# Patient Record
Sex: FEMALE | Race: WHITE | NOT HISPANIC OR LATINO | Employment: OTHER | URBAN - METROPOLITAN AREA
[De-identification: names, ages, dates, MRNs, and addresses within clinical notes are randomized per-mention and may not be internally consistent; named-entity substitution may affect disease eponyms.]

---

## 2024-10-25 ENCOUNTER — TRANSCRIBE ORDERS (OUTPATIENT)
Dept: PHYSICAL THERAPY | Facility: HOSPITAL | Age: 69
End: 2024-10-25
Payer: MEDICARE

## 2024-10-25 DIAGNOSIS — I89.0 CHRONIC ACQUIRED LYMPHEDEMA: Primary | ICD-10-CM

## 2024-12-05 ENCOUNTER — HOSPITAL ENCOUNTER (OUTPATIENT)
Dept: OCCUPATIONAL THERAPY | Facility: HOSPITAL | Age: 69
Discharge: HOME | End: 2024-12-05
Admitting: FAMILY MEDICINE
Payer: MEDICARE

## 2024-12-05 DIAGNOSIS — I89.0 LYMPHEDEMA OF BOTH LOWER EXTREMITIES: Primary | ICD-10-CM

## 2024-12-05 PROCEDURE — 97166 OT EVAL MOD COMPLEX 45 MIN: CPT

## 2024-12-05 PROCEDURE — 97535 SELF CARE MNGMENT TRAINING: CPT

## 2024-12-05 NOTE — THERAPY EVALUATION
Outpatient Occupational Therapy Lymphedema Initial Evaluation  [facility]     Patient Name: Raquel Montiel  : 1955  MRN: [id number]  Today's Date: 2024      Visit Date: 2024    There is no problem list on file for this patient.       No past medical history on file.     No past surgical history on file.      Visit Dx:     ICD-10-CM ICD-9-CM   1. Lymphedema of both lower extremities  I89.0 457.1        Patient History       Row Name 24 1300             History    Chief Complaint Swelling  -RE      Date Current Problem(s) Began 10/25/24  Referral date  -RE      Brief Description of Current Complaint Patient presents with complaints of bilateral lower extremity swelling.  She reports approximately a 5 to 6-year history of swelling in her right lower leg and a 2-year history of swelling in her left lower leg.  She has had 1 episode of cellulitis in the right lower extremity.  She has also had some problems with weeping from the legs.  She reports that since her Lasix dosage was increased some of her swelling has decreased.  -RE      Patient/Caregiver Goals Know what to do to help the symptoms;Decrease swelling  -RE      How has patient tried to help current problem? compression stocking but they caused knee swelling  -RE      Are you or can you be pregnant No  -RE         Pain     Pain Location Leg  -RE      Pain at Present 0  -RE      Pain Comments no pain at rest. 4/10 on palpation.  -RE         Fall Risk Assessment    Any falls in the past year: No  -RE      Number of falls reported in the last 12 months 0  -RE         Services    Are you currently receiving Home Health services No  -RE         Daily Activities    Primary Language English  -RE      Are you able to read Yes  -RE      Are you able to write Yes  -RE      How does patient learn best? Listening;Reading;Demonstration  -RE      Teaching needs identified Home Exercise Program;Management of Condition  -RE      Does patient have  "problems with the following? None  -RE      Barriers to learning None  -RE      Explanation of Functional Status Problem Patient uses a scooter at work due to knee pain.  No reported problems with ADLs.  -RE      Pt Participated in POC and Goals Yes  -RE         Safety    Are you being hurt, hit, or frightened by anyone at home or in your life? No  -RE      Are you being neglected by a caregiver No  -RE                User Key  (r) = Recorded By, (t) = Taken By, (c) = Cosigned By      Initials Name Provider Type    RE Flor Ron OTR Occupational Therapist                     Lymphedema       Row Name 12/05/24 1300             Subjective Pain    Able to rate subjective pain? yes  -RE      Pre-Treatment Pain Level 4  -RE      Post-Treatment Pain Level 4  -RE         Lymphedema Assessment    Lymphedema Classification RLE:;LLE:;stage 2 (Spontaneously Irreversible)  -RE         LLIS - Physical Concerns    The amount of pain associated with my lymphedema is: 3  -RE      The amount of limb heaviness associated with my lymphedema is: 2  -RE      The amount of skin tightness associated with my lymphedema is: 2  -RE      The size of my swollen limb(s) seems: 2  -RE      Lymphedema affects the movement of my swollen limb(s): 4  -RE      The strength in my swollen limb(s) is: 2  -RE         LLIS - Psychosocial Concerns    Lymphedema affects my body image (i.e., \"how I think I look\"). 4  -RE      Lymphedema affects my socializing with others. 4  -RE      Lymphedema affects my intimate relations with spouse or partner (rate 0 if not applicable 0  -RE      Lymphedema \"gets me down\" (i.e., depression, frustration, or anger) 1  -RE      I must rely on others for help due to my lymphedema. 4  -RE      I know what to do to manage my lymphedema 3  -RE         LLIS - Functional Concerns    Lymphedema affects my ability to perform self-care activities (i.e. eating, dressing, hygiene) 2  -RE      Lymphedema affects my ability to " perform routine home or work-related activities. 4  -RE      Lymphedema affects my performance of preferred leisure activities. 4  -RE      Lymphedema affects proper fit of clothing/shoes 3  -RE      Lymphedema affects my sleep 1  -RE         General ROM    GENERAL ROM COMMENTS Bilateral knee active range of motion is within functional limits but is painful.  -RE         MMT (Manual Muscle Testing)    General MMT Comments Lower extremity strength is grossly greater than or equal to 4/5 patient does have bilateral knee pain with resistance to flexion  -RE         Lymphedema Edema Assessment    Ptting Edema Category By grade out of 4  -RE      Pitting Edema + 3/4;Moderate;Severe  -RE      Stemmer Sign bilateral:;positive  -RE      Caldwell Hump bilateral:;negative  -RE         Skin Changes/Observations    Location/Assessment Lower Extremity  -RE      Lower Extremity Conditions bilateral:;intact;clean;dry  -RE      Lower Extremity Color/Pigment right:;hyperpigmented;brawny  -RE      Lower Extremity Skin Details Multiple small varicosities are located throughout bilateral lower legs.  Bilateral lower extremities are hyperpigmented however the right lower extremity is significantly worse.  -RE         Lymphedema Sensation    Lymphedema Sensation Reports RLE:;LLE:;normal  -RE         Lymphedema Measurements    Measurement Type(s) Circumferential  -RE      Circumferential Areas Lower extremities  -RE         BLE Circumferential (cm)    Measurement Location 1 20 cm above knee  -RE      Left 1 68 cm  -RE      Right 1 70 cm  -RE      Measurement Location 2 10 cm above knee  -RE      Left 2 58 cm  -RE      Right 2 61 cm  -RE      Measurement Location 3 Knee  -RE      Left 3 41 cm  -RE      Right 3 45 cm  -RE      Measurement Location 4 10 cm below knee  -RE      Left 4 45.5 cm  -RE      Right 4 48.5 cm  -RE      Measurement Location 5 20 cm below knee  -RE      Left 5 43.5 cm  -RE      Right 5 47.5 cm  -RE      Measurement  "Location 6 30 cm below knee  -RE      Left 6 34.9 cm  -RE      Right 6 36.5 cm  -RE      Measurement Location 7 Ankle  -RE      Left 7 31.5 cm  -RE      Right 7 32 cm  -RE      Measurement Location 8 Midfoot  -RE      Left 8 23.5 cm  -RE      Right 8 24.5 cm  -RE      LLE Circumferential Total 345.9 cm  -RE      RLE Circumferential Total 365 cm  -RE         Compression/Skin Care    Compression/Skin Care Comments Measured for bilateral lower leg reduction kits: Lower leg: Regular with and standard length; thigh: Regular width and short length.  I also measured for PAC bands for both feet size large  -RE         Lymphedema Life Impact Scale Totals    A.  Total Q1 - Q17 (Do not include Q18) 45  -RE      B.  Total number of questions answered (Q1-Q17) 17  -RE      C. Divide A by B 2.65  -RE      D. Multiple C by 25 66.25  -RE                User Key  (r) = Recorded By, (t) = Taken By, (c) = Cosigned By      Initials Name Provider Type    RE Flor Ron OTR Occupational Therapist                            Therapy Education  Education Details: Discussed lymphedema treatment including estimated duration. Gave patient \"Healthy Habits for Lymphedema Patients\" and \"What is Lymphedema\" and reviewed with patient. Discussed disease process and what to expect from therapy.  Given: Edema management, Symptoms/condition management, Bandaging/dressing change  Program: New  How Provided: Verbal, Written  Provided to: Patient  Level of Understanding: Teach back education performed, Verbalized  65692 - OT Self Care/Mgmt Minutes: 15         OT Goals       Row Name 12/05/24 1600          OT Short Term Goals    STG Date to Achieve 01/05/25  -RE     STG 1 Patient will demonstrate proper awareness of “What is Lymphedema?” and \"Healthy Habits\" for improved prevention, management, care of symptoms and ease of transition to self-care of condition.  -RE     STG 1 Progress New  -RE     STG 2 Patient independent and compliant with " self-wrapping techniques of compression bandages and/or velcro products with assistance of caregiver as needed for improved self-management of condition.  -RE     STG 2 Progress New  -RE     STG 3 Patient will demonstrate decreased net edema of bilateral lower extremities >/= 10-15cm  for decrease in edema, symptoms, decreased risk of infection and improved skin care/transition to self-care of condition.  -RE     STG 3 Progress New  -RE        Long Term Goals    LTG Date to Achieve 03/05/25  -RE     LTG 1 Patient will demonstrate decreased net edema of bilateral lower extremities >/= 16-25 cm  for decrease in edema, symptoms, decreased risk of infection and improved skin care/transition to self-care of condition.  -RE     LTG 1 Progress New  -RE     LTG 2 Patient independent and compliant with initial home exercise program focused on diaphragmatic breathing, range of motion, flexibility to decrease edema and improve lymphatic flow for decreased edema and decreased risk of infection.  -RE     LTG 2 Progress New  -RE     LTG 3 Patient to improve LLIS by 10 points by discharge.  -RE     LTG 3 Progress New  -RE     LTG 4 Patient independent and compliant with use and care of compression garments and/or Velcro products with assistance of a caregiver as needed to promote self-care independence.  -RE     LTG 4 Progress New  -RE        Time Calculation    OT Goal Re-Cert Due Date 03/05/25  -RE               User Key  (r) = Recorded By, (t) = Taken By, (c) = Cosigned By      Initials Name Provider Type    Flor Medrano OTR Occupational Therapist                     OT Assessment/Plan       Row Name 12/05/24 0039          OT Assessment    Functional Limitations Limitation in home management  -RE     Impairments Edema;Impaired lymphatic circulation;Impaired venous circulation  -RE     Assessment Comments Patient is a 69-year-old female who presents with signs and symptoms consistent with bilateral lower extremity  lymphedema which extends from toes to knees.  Edema is firm with 3+ pitting.  The total circumference of the right lower extremity is 365 cm and the left lower extremity is 345.9 cm.  Comorbidities which may affect progress include limited mobility and obesity.  She could benefit from Complete Decongestive Therapy to decrease edema, protect and improve skin integrity, decrease the risk of infection, and to learn self-care strategies.  Patient will require the use of compression products both during treatment and after.  I have recommended the use of a CircAid reduction kit for the full leg and PAC bands for both feet for use during the intensive phase of treatment.  -RE     OT Diagnosis Bilateral lower extremity lymphedema  -RE     OT Rehab Potential Good  -RE     Patient/caregiver participated in establishment of treatment plan and goals Yes  -RE     Patient would benefit from skilled therapy intervention Yes  -RE        OT Plan    OT Frequency 4x/week  -RE     Predicted Duration of Therapy Intervention (OT) 4 to 6 weeks  -RE     Planned CPT's? OT EVAL MOD COMPLEXITY: 22615;OT SELF CARE/MGMT/TRAIN 15 MIN: 92310;OT MANUAL THERAPY EA 15 MIN: 80339  -RE     Planned Therapy Interventions (Optional Details) home exercise program;patient/family education;manual therapy techniques;other (see comments)  -RE     OT Plan Comments Order patient's compression products from [employer]  -RE               User Key  (r) = Recorded By, (t) = Taken By, (c) = Cosigned By      Initials Name Provider Type    Flor Medrano OTR Occupational Therapist                              Time Calculation:   OT Start Time: 1330  OT Stop Time: 1425  OT Time Calculation (min): 55 min  Total Timed Code Minutes- OT: 15 minute(s)  Timed Charges  39375 - OT Self Care/Mgmt Minutes: 15  Untimed Charges  OT Eval/Re-eval Minutes: 40  Total Minutes  Timed Charges Total Minutes: 15  Untimed Charges Total Minutes: 40   Total Minutes: 55     Therapy Charges for  Today       Code Description Service Date Service Provider Modifiers Qty    47241589260 HC OT SELF CARE/MGMT/TRAIN EA 15 MIN 12/5/2024 Flor Ron OTR GO 1    00328330294 HC OT EVAL MOD COMPLEXITY 3 12/5/2024 Flor Ron OTR GO 1          Dictated utilizing Dragon dictation:  Much of this encounter note is an electronic transcription/translation of spoken language to printed text. The electronic translation of spoken language may permit erroneous, or at times, nonsensical words or phrases to be inadvertently transcribed; Although I have reviewed the note for such errors, some may still exist.            NANCY Frye  12/5/2024